# Patient Record
Sex: FEMALE
[De-identification: names, ages, dates, MRNs, and addresses within clinical notes are randomized per-mention and may not be internally consistent; named-entity substitution may affect disease eponyms.]

---

## 2023-06-12 ENCOUNTER — DASHBOARD ENCOUNTERS (OUTPATIENT)
Age: 48
End: 2023-06-12

## 2023-06-12 ENCOUNTER — OFFICE VISIT (OUTPATIENT)
Dept: URBAN - METROPOLITAN AREA CLINIC 72 | Facility: CLINIC | Age: 48
End: 2023-06-12

## 2023-06-12 RX ORDER — MELOXICAM 15 MG/1
TABLET ORAL
Qty: 30 TABLET | Status: ACTIVE | COMMUNITY

## 2023-06-12 RX ORDER — ONDANSETRON 4 MG/1
TABLET, ORALLY DISINTEGRATING ORAL
Qty: 10 EACH | Status: ACTIVE | COMMUNITY

## 2023-06-12 RX ORDER — IBUPROFEN 800 MG/1
TAKE 1 TABLET BY MOUTH THREE TIMES DAILY TABLET, FILM COATED ORAL
Qty: 90 EACH | Refills: 0 | Status: ACTIVE | COMMUNITY

## 2023-06-12 RX ORDER — CEPHALEXIN 500 MG/1
CAPSULE ORAL
Qty: 21 CAPSULE | Status: ACTIVE | COMMUNITY

## 2023-06-12 NOTE — HPI-TODAY'S VISIT:
48-year old female new to the clinic here for left-sided abdominal pain, nausea and vomiting.  Seen in the ER at [facility] 1/24/2023.  Reportedly seen the evening prior at [facility] was diagnosed with some sort of gallbladder disease was admitted overnight apparently put on antibiotics for surgical intervention but left AMA and disagreement with hospital staff.  Was at [facility] 5/6/2023 with gastritis and UTI.  Started on cephalexin and ordered omeprazole 20 mg daily as well as Zofran as needed.  Labs 1/24/2023.  CBC normal with WBC high end of normal 10.17, Hgb 12.6.  CMP: Chloride 109.  UA negative. Labs 5/6/2023.  CBC: WBC normal at 10.14, hemoglobin normal at 13.8. CMP: Normal.  UA positive nitratesLipase low at 12.1.  Abdomen ultrasound 1/24/2023 revealed cholelithiasis.  Ma sign was negative.  Was recommended she follow-up with surgeon as an outpatient. CT abdomen and pelvis with contrast 5/6/2023 revealed mild gallbladder distention no calcified stones.  Mild wall thickening gastritis cannot be excluded due to gastric under distention.